# Patient Record
Sex: FEMALE | Race: WHITE | NOT HISPANIC OR LATINO | URBAN - METROPOLITAN AREA
[De-identification: names, ages, dates, MRNs, and addresses within clinical notes are randomized per-mention and may not be internally consistent; named-entity substitution may affect disease eponyms.]

---

## 2020-07-25 ENCOUNTER — TELEPHONE ENCOUNTER (OUTPATIENT)
Dept: URBAN - METROPOLITAN AREA CLINIC 13 | Facility: CLINIC | Age: 60
End: 2020-07-25

## 2020-07-25 RX ORDER — LINACLOTIDE 290 UG/1
TAKE 1 CPSULE BY MOUTH EVERY DAY 30 MINUTE BEFORE BREAKFAST CAPSULE, GELATIN COATED ORAL
Qty: 30 | Refills: 5 | OUTPATIENT
Start: 2018-03-29 | End: 2020-02-05

## 2020-07-25 RX ORDER — DEXTROAMPHETAMINE SULFATE, DEXTROAMPHETAMINE SACCHARATE, AMPHETAMINE SULFATE AND AMPHETAMINE ASPARTATE 2.5; 2.5; 2.5; 2.5 MG/1; MG/1; MG/1; MG/1
TAKE 1 CAPSULE DAILY FOR ADHD CAPSULE, EXTENDED RELEASE ORAL
Refills: 0 | OUTPATIENT
End: 2020-02-05

## 2020-07-25 RX ORDER — POLYETHYLENE GLYCOL 3350
TAKE 17 GM IN 8OZ. LIQUID EVERDAY POWDER (GRAM) MISCELLANEOUS
Qty: 570 | Refills: 6 | OUTPATIENT
Start: 2010-05-20

## 2020-07-25 RX ORDER — MULTIVIT-MIN/FA/LYCOPEN/LUTEIN .4-300-25
TAKE 1 TABLET DAILY TABLET ORAL
Refills: 0 | OUTPATIENT
Start: 2010-04-07 | End: 2018-02-02

## 2020-07-25 RX ORDER — TURMERIC/TURMERIC EXT/PEPR EXT 900-100 MG
TAKE 1 CAPSULE DAILY CAPSULE ORAL
Refills: 0 | OUTPATIENT
End: 2020-02-05

## 2020-07-25 RX ORDER — DEXTROAMPHETAMINE SULFATE, DEXTROAMPHETAMINE SACCHARATE, AMPHETAMINE SULFATE AND AMPHETAMINE ASPARTATE 6.25; 6.25; 6.25; 6.25 MG/1; MG/1; MG/1; MG/1
TAKE 1 CAPSULE DAILY FOR ADHD CAPSULE, EXTENDED RELEASE ORAL
Refills: 0 | OUTPATIENT
End: 2020-02-05

## 2020-07-25 RX ORDER — VENLAFAXINE HYDROCHLORIDE 150 MG/1
TAKE 1 CAPSULE ONCE DAILY WITH FOOD CAPSULE, EXTENDED RELEASE ORAL
Refills: 0 | OUTPATIENT
End: 2018-02-02

## 2020-07-25 RX ORDER — POLYETHYLENE GLYCOL 3350, SODIUM CHLORIDE, SODIUM BICARBONATE AND POTASSIUM CHLORIDE WITH LEMON FLAVOR 420; 11.2; 5.72; 1.48 G/4L; G/4L; G/4L; G/4L
USE AS DIRECTED POWDER, FOR SOLUTION ORAL
Qty: 1 | Refills: 0 | OUTPATIENT
Start: 2018-02-02 | End: 2018-02-05

## 2020-07-26 ENCOUNTER — TELEPHONE ENCOUNTER (OUTPATIENT)
Dept: URBAN - METROPOLITAN AREA CLINIC 13 | Facility: CLINIC | Age: 60
End: 2020-07-26

## 2020-07-26 RX ORDER — DEXTROAMPHETAMINE SACCHARATE, AMPHETAMINE ASPARTATE MONOHYDRATE, DEXTROAMPHETAMINE SULFATE, AND AMPHETAMINE SULFATE 5; 5; 5; 5 MG/1; MG/1; MG/1; MG/1
CAPSULE, EXTENDED RELEASE ORAL
Qty: 60 | Refills: 0 | Status: ACTIVE | COMMUNITY
Start: 2019-09-12

## 2020-07-26 RX ORDER — HYDROCHLOROTHIAZIDE 50 MG/1
TAKE 1 TABLET DAILY TABLET ORAL
Refills: 0 | Status: ACTIVE | COMMUNITY
Start: 2019-04-24

## 2020-07-26 RX ORDER — TRAZODONE HYDROCHLORIDE 50 MG/1
TAKE 0.5 TABLET DAILY TABLET ORAL
Refills: 0 | Status: ACTIVE | COMMUNITY

## 2020-07-26 RX ORDER — DEXMETHYLPHENIDATE HYDROCHLORIDE 30 MG/1
TAKE 1 CAPSULE BY MOUTH ONCE DAILY IN THE MORNING CAPSULE, EXTENDED RELEASE ORAL
Qty: 30 | Refills: 0 | Status: ACTIVE | COMMUNITY
Start: 2020-01-14

## 2020-07-26 RX ORDER — CLONAZEPAM 2 MG/1
TAKE 0.5 TABLET DAILY TABLET ORAL
Refills: 0 | Status: ACTIVE | COMMUNITY

## 2020-07-26 RX ORDER — CLONAZEPAM 1 MG/1
TABLET ORAL
Qty: 45 | Refills: 0 | Status: ACTIVE | COMMUNITY
Start: 2019-05-23

## 2020-07-26 RX ORDER — CHOLECALCIFEROL (VITAMIN D3) 125 MCG
TAKE 1 TABLET DAILY TABLET ORAL
Refills: 0 | Status: ACTIVE | COMMUNITY

## 2020-07-26 RX ORDER — DEXMETHYLPHENIDATE HYDROCHLORIDE 20 MG/1
TAKE 1 CAPSULE BY MOUTH ONCE DAILY IN THE MORNING CAPSULE, EXTENDED RELEASE ORAL
Qty: 30 | Refills: 0 | Status: ACTIVE | COMMUNITY
Start: 2019-12-23

## 2020-07-26 RX ORDER — ONDANSETRON 8 MG/1
TABLET ORAL
Qty: 10 | Refills: 0 | Status: ACTIVE | COMMUNITY
Start: 2019-05-02

## 2020-07-26 RX ORDER — LACTULOSE 10 G/15ML
SOLUTION ORAL
Qty: 210 | Refills: 0 | Status: ACTIVE | COMMUNITY
Start: 2020-02-02

## 2020-07-26 RX ORDER — DEXTROAMPHETAMINE SACCHARATE, AMPHETAMINE ASPARTATE, DEXTROAMPHETAMINE SULFATE AND AMPHETAMINE SULFATE 2.5; 2.5; 2.5; 2.5 MG/1; MG/1; MG/1; MG/1
TAKE 1 TABLET BY MOUTH ONCE DAILY AS NEEDED IN THE AFTERNOON TABLET ORAL
Qty: 30 | Refills: 0 | Status: ACTIVE | COMMUNITY
Start: 2019-11-22

## 2020-09-18 ENCOUNTER — TELEPHONE ENCOUNTER (OUTPATIENT)
Dept: URBAN - METROPOLITAN AREA CLINIC 113 | Facility: CLINIC | Age: 60
End: 2020-09-18

## 2023-05-04 ENCOUNTER — TELEPHONE ENCOUNTER (OUTPATIENT)
Dept: URBAN - METROPOLITAN AREA CLINIC 113 | Facility: CLINIC | Age: 63
End: 2023-05-04

## 2024-06-17 ENCOUNTER — OFFICE VISIT (OUTPATIENT)
Dept: URBAN - METROPOLITAN AREA CLINIC 113 | Facility: CLINIC | Age: 64
End: 2024-06-17

## 2024-07-15 ENCOUNTER — OFFICE VISIT (OUTPATIENT)
Dept: URBAN - METROPOLITAN AREA CLINIC 113 | Facility: CLINIC | Age: 64
End: 2024-07-15

## 2024-08-19 ENCOUNTER — DASHBOARD ENCOUNTERS (OUTPATIENT)
Age: 64
End: 2024-08-19

## 2024-08-19 ENCOUNTER — OFFICE VISIT (OUTPATIENT)
Dept: URBAN - METROPOLITAN AREA CLINIC 113 | Facility: CLINIC | Age: 64
End: 2024-08-19

## 2024-08-19 ENCOUNTER — LAB OUTSIDE AN ENCOUNTER (OUTPATIENT)
Dept: URBAN - METROPOLITAN AREA CLINIC 113 | Facility: CLINIC | Age: 64
End: 2024-08-19

## 2024-08-19 VITALS
TEMPERATURE: 98.6 F | WEIGHT: 136 LBS | HEART RATE: 69 BPM | HEIGHT: 63 IN | SYSTOLIC BLOOD PRESSURE: 142 MMHG | BODY MASS INDEX: 24.1 KG/M2 | DIASTOLIC BLOOD PRESSURE: 93 MMHG

## 2024-08-19 PROBLEM — 111891008: Status: ACTIVE | Noted: 2024-08-19

## 2024-08-19 PROBLEM — 235595009: Status: ACTIVE | Noted: 2024-08-19

## 2024-08-19 PROBLEM — 35298007: Status: ACTIVE | Noted: 2024-08-19

## 2024-08-19 PROBLEM — 70342003: Status: ACTIVE | Noted: 2024-08-19

## 2024-08-19 RX ORDER — LACTULOSE 10 G/15ML
SOLUTION ORAL
Qty: 210 | Refills: 0 | Status: ON HOLD | COMMUNITY
Start: 2020-02-02

## 2024-08-19 RX ORDER — TRAZODONE HYDROCHLORIDE 50 MG/1
1 TABLET AT BEDTIME AS NEEDED TABLET ORAL ONCE A DAY
Status: ACTIVE | COMMUNITY

## 2024-08-19 RX ORDER — DEXTROAMPHETAMINE SACCHARATE, AMPHETAMINE ASPARTATE, DEXTROAMPHETAMINE SULFATE AND AMPHETAMINE SULFATE 2.5; 2.5; 2.5; 2.5 MG/1; MG/1; MG/1; MG/1
TAKE 1 TABLET BY MOUTH ONCE DAILY AS NEEDED IN THE AFTERNOON TABLET ORAL
Qty: 30 | Refills: 0 | Status: ON HOLD | COMMUNITY
Start: 2019-11-22

## 2024-08-19 RX ORDER — CLONAZEPAM 2 MG/1
TAKE 0.5 TABLET DAILY TABLET ORAL
Refills: 0 | Status: ON HOLD | COMMUNITY

## 2024-08-19 RX ORDER — LACTULOSE 10 G/15ML
15-30 ML AS NEEDED SOLUTION ORAL TWICE A DAY
Qty: 1800 ML | Refills: 0 | OUTPATIENT
Start: 2024-08-19 | End: 2024-09-18

## 2024-08-19 RX ORDER — TRAZODONE HYDROCHLORIDE 50 MG/1
TAKE 0.5 TABLET DAILY TABLET ORAL
Refills: 0 | Status: ON HOLD | COMMUNITY

## 2024-08-19 RX ORDER — DEXMETHYLPHENIDATE HYDROCHLORIDE 30 MG/1
TAKE 1 CAPSULE BY MOUTH ONCE DAILY IN THE MORNING CAPSULE, EXTENDED RELEASE ORAL
Qty: 30 | Refills: 0 | Status: ON HOLD | COMMUNITY
Start: 2020-01-14

## 2024-08-19 RX ORDER — DIAZEPAM 10 MG/1
1 TABLET AS NEEDED TABLET ORAL ONCE A DAY
Status: ACTIVE | COMMUNITY

## 2024-08-19 RX ORDER — CLONAZEPAM 1 MG/1
TABLET ORAL
Qty: 45 | Refills: 0 | Status: ON HOLD | COMMUNITY
Start: 2019-05-23

## 2024-08-19 RX ORDER — DEXMETHYLPHENIDATE HYDROCHLORIDE 20 MG/1
TAKE 1 CAPSULE BY MOUTH ONCE DAILY IN THE MORNING CAPSULE, EXTENDED RELEASE ORAL
Qty: 30 | Refills: 0 | Status: ON HOLD | COMMUNITY
Start: 2019-12-23

## 2024-08-19 RX ORDER — DEXTROAMPHETAMINE SACCHARATE, AMPHETAMINE ASPARTATE MONOHYDRATE, DEXTROAMPHETAMINE SULFATE, AND AMPHETAMINE SULFATE 5; 5; 5; 5 MG/1; MG/1; MG/1; MG/1
CAPSULE, EXTENDED RELEASE ORAL
Qty: 60 | Refills: 0 | Status: ON HOLD | COMMUNITY
Start: 2019-09-12

## 2024-08-19 RX ORDER — CHOLECALCIFEROL (VITAMIN D3) 125 MCG
TAKE 1 TABLET DAILY TABLET ORAL
Refills: 0 | Status: ON HOLD | COMMUNITY

## 2024-08-19 RX ORDER — BUPRENORPHINE HYDROCHLORIDE, NALOXONE HYDROCHLORIDE 8; 2 MG/1; MG/1
1 FILM UNDER THE TONGUE AND ALLOW TO DISSOLVE FILM, SOLUBLE BUCCAL; SUBLINGUAL ONCE A DAY
Status: ACTIVE | COMMUNITY

## 2024-08-19 RX ORDER — ONDANSETRON 8 MG/1
TABLET ORAL
Qty: 10 | Refills: 0 | Status: ON HOLD | COMMUNITY
Start: 2019-05-02

## 2024-08-19 RX ORDER — POLYETHYLENE GLYCOL 3350, SODIUM SULFATE ANHYDROUS, SODIUM BICARBONATE, SODIUM CHLORIDE, POTASSIUM CHLORIDE 236; 22.74; 6.74; 5.86; 2.97 G/4L; G/4L; G/4L; G/4L; G/4L
2000 ML POWDER, FOR SOLUTION ORAL AS DIRECTED
Qty: 4000 ML | Refills: 0 | OUTPATIENT
Start: 2024-08-19 | End: 2024-08-20

## 2024-08-19 RX ORDER — HYDROCHLOROTHIAZIDE 50 MG/1
TAKE 1 TABLET DAILY TABLET ORAL
Refills: 0 | Status: ON HOLD | COMMUNITY
Start: 2019-04-24

## 2024-08-19 NOTE — HPI-TODAY'S VISIT:
Robert is a 64-year-old woman who was referred to our office in April 2023 by KRISTA Arevalo for evaluation of functional dyspepsia and possible H. pylori infection.  Copy of this visit forwarded to referring provider.  Most recent labs available for review from 4/5/2024 show negative H. pylori breath test normal amylase 80: HBVDNA 1620.  Labs available for review from 1/16/2024 showed normal TSH, reactive hepatitis B surface antigen, negative hepatitis C antibody elevated cholesterol 206, LFTs normal  Colonoscopy 4/17/2024  Reports she was diagnosed with hepatitis B in 2001.  Today she reports she deals with constipation. She will take episom salts, once every 5 days, previously mag citrate.  she experiences shortness of breath that is intermittent. Intermittent Nausea 2019. Intermittent right-sided pain. Lower abdominal pain. After meals she will become very tired. She checks her blood sugar level, they have been normal. 2023 increase in drooling. She takes apple cider vinegar. Increase mind fog. She also reports light "bruising" on her left arm that started in 2023. Endorses upper extremity itching. In 2023 left-sided lower abdominal spasming.

## 2024-08-22 ENCOUNTER — TELEPHONE ENCOUNTER (OUTPATIENT)
Dept: URBAN - METROPOLITAN AREA CLINIC 113 | Facility: CLINIC | Age: 64
End: 2024-08-22

## 2024-08-26 ENCOUNTER — OFFICE VISIT (OUTPATIENT)
Dept: URBAN - METROPOLITAN AREA CLINIC 113 | Facility: CLINIC | Age: 64
End: 2024-08-26

## 2024-08-30 ENCOUNTER — WEB ENCOUNTER (OUTPATIENT)
Dept: URBAN - METROPOLITAN AREA CLINIC 113 | Facility: CLINIC | Age: 64
End: 2024-08-30

## 2024-09-10 ENCOUNTER — WEB ENCOUNTER (OUTPATIENT)
Dept: URBAN - METROPOLITAN AREA CLINIC 113 | Facility: CLINIC | Age: 64
End: 2024-09-10

## 2024-09-11 ENCOUNTER — OFFICE VISIT (OUTPATIENT)
Dept: URBAN - METROPOLITAN AREA CLINIC 113 | Facility: CLINIC | Age: 64
End: 2024-09-11

## 2024-09-14 ENCOUNTER — WEB ENCOUNTER (OUTPATIENT)
Dept: URBAN - METROPOLITAN AREA CLINIC 113 | Facility: CLINIC | Age: 64
End: 2024-09-14

## 2024-09-17 ENCOUNTER — TELEPHONE ENCOUNTER (OUTPATIENT)
Dept: URBAN - METROPOLITAN AREA CLINIC 113 | Facility: CLINIC | Age: 64
End: 2024-09-17

## 2024-09-19 ENCOUNTER — WEB ENCOUNTER (OUTPATIENT)
Dept: URBAN - METROPOLITAN AREA CLINIC 113 | Facility: CLINIC | Age: 64
End: 2024-09-19

## 2024-09-20 ENCOUNTER — WEB ENCOUNTER (OUTPATIENT)
Dept: URBAN - METROPOLITAN AREA CLINIC 113 | Facility: CLINIC | Age: 64
End: 2024-09-20

## 2024-09-26 ENCOUNTER — WEB ENCOUNTER (OUTPATIENT)
Dept: URBAN - METROPOLITAN AREA CLINIC 113 | Facility: CLINIC | Age: 64
End: 2024-09-26

## 2024-09-30 ENCOUNTER — WEB ENCOUNTER (OUTPATIENT)
Dept: URBAN - METROPOLITAN AREA CLINIC 113 | Facility: CLINIC | Age: 64
End: 2024-09-30

## 2024-10-04 ENCOUNTER — TELEPHONE ENCOUNTER (OUTPATIENT)
Dept: URBAN - METROPOLITAN AREA CLINIC 113 | Facility: CLINIC | Age: 64
End: 2024-10-04

## 2024-10-04 RX ORDER — PROMETHAZINE HYDROCHLORIDE 25 MG/1
1 TABLET AS NEEDED TABLET ORAL
Qty: 60 | OUTPATIENT
Start: 2024-10-09 | End: 2024-11-07

## 2024-10-04 RX ORDER — LACTULOSE 10 G/15ML
15-30 ML AS NEEDED SOLUTION ORAL TWICE A DAY
Qty: 1800 ML | Refills: 0
Start: 2024-08-19 | End: 2024-11-07

## 2024-10-09 ENCOUNTER — LAB OUTSIDE AN ENCOUNTER (OUTPATIENT)
Dept: URBAN - METROPOLITAN AREA CLINIC 113 | Facility: CLINIC | Age: 64
End: 2024-10-09

## 2024-10-09 ENCOUNTER — TELEPHONE ENCOUNTER (OUTPATIENT)
Dept: URBAN - METROPOLITAN AREA CLINIC 113 | Facility: CLINIC | Age: 64
End: 2024-10-09

## 2024-10-09 PROBLEM — 123608004: Status: ACTIVE | Noted: 2024-10-09

## 2024-10-14 ENCOUNTER — OFFICE VISIT (OUTPATIENT)
Dept: URBAN - METROPOLITAN AREA CLINIC 113 | Facility: CLINIC | Age: 64
End: 2024-10-14
Payer: COMMERCIAL

## 2024-10-14 VITALS
DIASTOLIC BLOOD PRESSURE: 95 MMHG | HEIGHT: 63 IN | HEART RATE: 48 BPM | SYSTOLIC BLOOD PRESSURE: 160 MMHG | TEMPERATURE: 97.7 F | RESPIRATION RATE: 12 BRPM | BODY MASS INDEX: 26.19 KG/M2 | WEIGHT: 147.8 LBS

## 2024-10-14 DIAGNOSIS — K83.8 DILATED BILE DUCT: ICD-10-CM

## 2024-10-14 DIAGNOSIS — R93.89 ABNORMAL ULTRASOUND: ICD-10-CM

## 2024-10-14 DIAGNOSIS — K59.01 SLOW TRANSIT CONSTIPATION: ICD-10-CM

## 2024-10-14 DIAGNOSIS — B18.1 CHRONIC HEPATITIS B: ICD-10-CM

## 2024-10-14 DIAGNOSIS — K80.20 CALCULUS OF GALLBLADDER WITHOUT CHOLECYSTITIS WITHOUT OBSTRUCTION: ICD-10-CM

## 2024-10-14 PROBLEM — 61977001: Status: ACTIVE | Noted: 2024-10-14

## 2024-10-14 PROCEDURE — 99214 OFFICE O/P EST MOD 30 MIN: CPT

## 2024-10-14 RX ORDER — DIAZEPAM 10 MG/1
1 TABLET AS NEEDED TABLET ORAL ONCE A DAY
Status: ACTIVE | COMMUNITY

## 2024-10-14 RX ORDER — TRAZODONE HYDROCHLORIDE 50 MG/1
1 TABLET AT BEDTIME AS NEEDED TABLET ORAL ONCE A DAY
Status: ACTIVE | COMMUNITY

## 2024-10-14 RX ORDER — BUPRENORPHINE HYDROCHLORIDE, NALOXONE HYDROCHLORIDE 8; 2 MG/1; MG/1
1 FILM UNDER THE TONGUE AND ALLOW TO DISSOLVE FILM, SOLUBLE BUCCAL; SUBLINGUAL ONCE A DAY
Status: ACTIVE | COMMUNITY

## 2024-10-14 RX ORDER — LACTULOSE 10 G/15ML
15-30 ML AS NEEDED SOLUTION ORAL TWICE A DAY
Qty: 1800 ML | Refills: 0 | Status: ACTIVE | COMMUNITY
Start: 2024-08-19 | End: 2024-11-07

## 2024-10-14 RX ORDER — FLUOXETINE HYDROCHLORIDE 40 MG/1
1 CAPSULE CAPSULE ORAL ONCE A DAY
Status: ACTIVE | COMMUNITY

## 2024-10-14 RX ORDER — PROMETHAZINE HYDROCHLORIDE 25 MG/1
1 TABLET AS NEEDED TABLET ORAL
Qty: 60 | Status: ACTIVE | COMMUNITY
Start: 2024-10-09 | End: 2024-11-07

## 2024-10-14 NOTE — HPI-TODAY'S VISIT:
Ms. Jones is a 64-year-old woman with a history of hepatitis B, anxiety, hypertension, GERD, constipation presenting for follow-up after MRI abdomen.  She was last in office on 8/19/2024 for evaluation multiple GI complaints.  She has a history hepatitis B infection with recent HPV DNA of 1620 and normal LFTs.  She has chronic constipation discussed option of Linzess she would like to trial lactulose as she try to remain so that worked for her.  Recommend we complete a bowel purge prior to starting.  Will also update CBC, CMP, TSH regarding ultrasound which showed hepatic lesions, consistent with cyst of hepatic steatosis, MRI with and without contrast was ordered to further characterize.  Her MRI showed multiple hepatic cyst without concerning features.  Mild biliary ductal dilatation, common bile duct dilatation up to 1.1 cm.  No discrete distal filling defect or obstructing masses identified.  ERCP could be obtained to exclude occult obstructing mass or stricture.  1.8 cm left adrenal adenoma.  Biochemical testing for functional status evaluation is recommended.  This was discussed with Dr. Alexandre and Dr. Hansen who recommended proceeding with EUS this is scheduled for the patient on 10/21/2024.  Regarding her hepatitis B and discussed treatment, HCC screening and is recommended to be referred to endocrinology.  Today she reports continued complaints of constipation, though this is improved with Lactulose. She is no longer taking Kratom. She also is concerned for her liver and cirrhosis we discuss the imaging. She has a periumbilical "swelling" that she is concerned with. Her weight has remained overall stable since last office visit. THere is no jaundice or icterus.

## 2024-10-17 ENCOUNTER — TELEPHONE ENCOUNTER (OUTPATIENT)
Dept: URBAN - METROPOLITAN AREA CLINIC 113 | Facility: CLINIC | Age: 64
End: 2024-10-17

## 2024-10-21 ENCOUNTER — OFFICE VISIT (OUTPATIENT)
Dept: URBAN - METROPOLITAN AREA MEDICAL CENTER 19 | Facility: MEDICAL CENTER | Age: 64
End: 2024-10-21
Payer: COMMERCIAL

## 2024-10-21 DIAGNOSIS — K80.20 ASYMPTOMATIC CHOLELITHIASIS: ICD-10-CM

## 2024-10-21 DIAGNOSIS — K76.89 BENIGN LIVER CYST: ICD-10-CM

## 2024-10-21 DIAGNOSIS — K83.8 ACQUIRED DILATION OF BILE DUCT: ICD-10-CM

## 2024-10-21 DIAGNOSIS — K29.50 ANTRAL GASTRITIS: ICD-10-CM

## 2024-10-21 PROCEDURE — 43259 EGD US EXAM DUODENUM/JEJUNUM: CPT | Performed by: INTERNAL MEDICINE

## 2024-10-21 PROCEDURE — 43239 EGD BIOPSY SINGLE/MULTIPLE: CPT | Performed by: INTERNAL MEDICINE

## 2024-10-21 RX ORDER — FLUOXETINE HYDROCHLORIDE 40 MG/1
1 CAPSULE CAPSULE ORAL ONCE A DAY
Status: ACTIVE | COMMUNITY

## 2024-10-21 RX ORDER — BUPRENORPHINE HYDROCHLORIDE, NALOXONE HYDROCHLORIDE 8; 2 MG/1; MG/1
1 FILM UNDER THE TONGUE AND ALLOW TO DISSOLVE FILM, SOLUBLE BUCCAL; SUBLINGUAL ONCE A DAY
Status: ACTIVE | COMMUNITY

## 2024-10-21 RX ORDER — TRAZODONE HYDROCHLORIDE 50 MG/1
1 TABLET AT BEDTIME AS NEEDED TABLET ORAL ONCE A DAY
Status: ACTIVE | COMMUNITY

## 2024-10-21 RX ORDER — DIAZEPAM 10 MG/1
1 TABLET AS NEEDED TABLET ORAL ONCE A DAY
Status: ACTIVE | COMMUNITY

## 2024-10-21 RX ORDER — PROMETHAZINE HYDROCHLORIDE 25 MG/1
1 TABLET AS NEEDED TABLET ORAL
Qty: 60 | Status: ACTIVE | COMMUNITY
Start: 2024-10-09 | End: 2024-11-07

## 2024-10-21 RX ORDER — LACTULOSE 10 G/15ML
15-30 ML AS NEEDED SOLUTION ORAL TWICE A DAY
Qty: 1800 ML | Refills: 0 | Status: ACTIVE | COMMUNITY
Start: 2024-08-19 | End: 2024-11-07

## 2024-10-28 ENCOUNTER — TELEPHONE ENCOUNTER (OUTPATIENT)
Dept: URBAN - METROPOLITAN AREA CLINIC 113 | Facility: CLINIC | Age: 64
End: 2024-10-28

## 2024-11-04 ENCOUNTER — TELEPHONE ENCOUNTER (OUTPATIENT)
Dept: URBAN - METROPOLITAN AREA CLINIC 113 | Facility: CLINIC | Age: 64
End: 2024-11-04

## 2024-11-05 ENCOUNTER — TELEPHONE ENCOUNTER (OUTPATIENT)
Dept: URBAN - METROPOLITAN AREA CLINIC 113 | Facility: CLINIC | Age: 64
End: 2024-11-05

## 2024-11-05 RX ORDER — TENOFOVIR DISPROXIL FUMARATE 300 MG/1
1 TABLET TABLET ORAL ONCE A DAY
Qty: 30 | Refills: 1 | OUTPATIENT
Start: 2024-11-05

## 2024-11-06 ENCOUNTER — TELEPHONE ENCOUNTER (OUTPATIENT)
Dept: URBAN - METROPOLITAN AREA CLINIC 113 | Facility: CLINIC | Age: 64
End: 2024-11-06

## 2024-11-11 ENCOUNTER — OFFICE VISIT (OUTPATIENT)
Dept: URBAN - METROPOLITAN AREA CLINIC 113 | Facility: CLINIC | Age: 64
End: 2024-11-11

## 2024-11-13 ENCOUNTER — TELEPHONE ENCOUNTER (OUTPATIENT)
Dept: URBAN - METROPOLITAN AREA CLINIC 113 | Facility: CLINIC | Age: 64
End: 2024-11-13

## 2024-11-13 RX ORDER — LACTULOSE 10 G/15ML
15-30 ML AS NEEDED SOLUTION ORAL TWICE A DAY
Qty: 1800 ML | Refills: 0
Start: 2024-08-19 | End: 2024-12-13

## 2024-12-20 ENCOUNTER — TELEPHONE ENCOUNTER (OUTPATIENT)
Dept: URBAN - METROPOLITAN AREA CLINIC 113 | Facility: CLINIC | Age: 64
End: 2024-12-20

## 2025-01-15 ENCOUNTER — TELEPHONE ENCOUNTER (OUTPATIENT)
Dept: URBAN - METROPOLITAN AREA CLINIC 113 | Facility: CLINIC | Age: 65
End: 2025-01-15

## 2025-01-15 RX ORDER — LACTULOSE 10 G/15ML
15-30 ML AS NEEDED SOLUTION ORAL TWICE A DAY
Qty: 1800 ML | Refills: 0
Start: 2024-08-19 | End: 2025-02-14

## 2025-08-03 ENCOUNTER — TELEPHONE ENCOUNTER (OUTPATIENT)
Dept: URBAN - METROPOLITAN AREA CLINIC 113 | Facility: CLINIC | Age: 65
End: 2025-08-03